# Patient Record
Sex: MALE | Race: WHITE
[De-identification: names, ages, dates, MRNs, and addresses within clinical notes are randomized per-mention and may not be internally consistent; named-entity substitution may affect disease eponyms.]

---

## 2021-11-21 ENCOUNTER — HOSPITAL ENCOUNTER (EMERGENCY)
Dept: HOSPITAL 100 - ED | Age: 66
Discharge: LEFT BEFORE DISCHARGE | End: 2021-11-21
Payer: COMMERCIAL

## 2021-11-21 VITALS
TEMPERATURE: 96.5 F | HEART RATE: 71 BPM | OXYGEN SATURATION: 98 % | DIASTOLIC BLOOD PRESSURE: 102 MMHG | RESPIRATION RATE: 16 BRPM | SYSTOLIC BLOOD PRESSURE: 193 MMHG

## 2021-11-21 VITALS — BODY MASS INDEX: 26.4 KG/M2

## 2021-11-21 DIAGNOSIS — R30.0: Primary | ICD-10-CM

## 2021-11-21 LAB
ANION GAP: 5 (ref 5–15)
BUN SERPL-MCNC: 20 MG/DL (ref 7–18)
BUN/CREAT RATIO: 17.5 RATIO (ref 10–20)
CALCIUM SERPL-MCNC: 9.5 MG/DL (ref 8.5–10.1)
CARBON DIOXIDE: 27 MMOL/L (ref 21–32)
CHLORIDE: 108 MMOL/L (ref 98–107)
DEPRECATED RDW RBC: 43.3 FL (ref 35.1–43.9)
ERYTHROCYTE [DISTWIDTH] IN BLOOD: 12.3 % (ref 11.6–14.6)
EST GLOM FILT RATE - AFR AMER: 82 ML/MIN (ref 60–?)
ESTIMATED CREATININE CLEARANCE: 71.06 ML/MIN
GLUCOSE: 106 MG/DL (ref 74–106)
HCT VFR BLD AUTO: 40.7 % (ref 40–54)
HEMOGLOBIN: 13.7 G/DL (ref 13–16.5)
HGB BLD-MCNC: 13.7 G/DL (ref 13–16.5)
IMMATURE GRANULOCYTES COUNT: 0.04 X10^3/UL (ref 0–0)
MCV RBC: 95.8 FL (ref 80–94)
MEAN CORP HGB CONC: 33.7 G/DL (ref 32–36)
MEAN PLATELET VOL.: 9.9 FL (ref 6.2–12)
MUCOUS THREADS URNS QL MICRO: (no result) /HPF
NRBC FLAGGED BY ANALYZER: 0 % (ref 0–5)
PLATELET # BLD: 192 K/MM3 (ref 150–450)
PLATELET COUNT: 192 K/MM3 (ref 150–450)
POTASSIUM: 3.9 MMOL/L (ref 3.5–5.1)
PROT UR QL STRIP.AUTO: 30 MG/DL
RBC # BLD AUTO: 4.25 M/MM3 (ref 4.6–6.2)
RBC DISTRIBUTION WIDTH CV: 12.3 % (ref 11.6–14.6)
RBC DISTRIBUTION WIDTH SD: 43.3 FL (ref 35.1–43.9)
RBC UR QL: (no result) /HPF (ref 0–5)
RBC UR QL: 250 /UL
SP GR UR: 1.02 (ref 1–1.03)
SQUAMOUS URNS QL MICRO: (no result) /HPF (ref 0–5)
URINE PRESERVATIVE: (no result)
WBC # BLD AUTO: 11.1 K/MM3 (ref 4.4–11)
WHITE BLOOD COUNT: 11.1 K/MM3 (ref 4.4–11)

## 2021-11-21 PROCEDURE — 36415 COLL VENOUS BLD VENIPUNCTURE: CPT

## 2021-11-21 PROCEDURE — 99282 EMERGENCY DEPT VISIT SF MDM: CPT

## 2021-11-21 PROCEDURE — 85025 COMPLETE CBC W/AUTO DIFF WBC: CPT

## 2021-11-21 PROCEDURE — 80048 BASIC METABOLIC PNL TOTAL CA: CPT

## 2021-11-21 PROCEDURE — 81001 URINALYSIS AUTO W/SCOPE: CPT

## 2021-11-21 PROCEDURE — 99281 EMR DPT VST MAYX REQ PHY/QHP: CPT

## 2023-01-13 ENCOUNTER — HOSPITAL ENCOUNTER (EMERGENCY)
Age: 68
Discharge: HOME | End: 2023-01-13
Payer: COMMERCIAL

## 2023-01-13 VITALS
OXYGEN SATURATION: 97 % | DIASTOLIC BLOOD PRESSURE: 101 MMHG | RESPIRATION RATE: 16 BRPM | HEART RATE: 59 BPM | SYSTOLIC BLOOD PRESSURE: 187 MMHG

## 2023-01-13 VITALS
SYSTOLIC BLOOD PRESSURE: 189 MMHG | OXYGEN SATURATION: 97 % | RESPIRATION RATE: 16 BRPM | DIASTOLIC BLOOD PRESSURE: 108 MMHG | HEART RATE: 56 BPM

## 2023-01-13 VITALS
SYSTOLIC BLOOD PRESSURE: 184 MMHG | DIASTOLIC BLOOD PRESSURE: 100 MMHG | RESPIRATION RATE: 18 BRPM | HEART RATE: 55 BPM | TEMPERATURE: 96.7 F | OXYGEN SATURATION: 96 %

## 2023-01-13 VITALS
HEART RATE: 59 BPM | SYSTOLIC BLOOD PRESSURE: 197 MMHG | OXYGEN SATURATION: 98 % | DIASTOLIC BLOOD PRESSURE: 102 MMHG | RESPIRATION RATE: 16 BRPM

## 2023-01-13 VITALS — BODY MASS INDEX: 26.6 KG/M2

## 2023-01-13 VITALS — OXYGEN SATURATION: 98 %

## 2023-01-13 DIAGNOSIS — R07.9: Primary | ICD-10-CM

## 2023-01-13 DIAGNOSIS — R00.2: ICD-10-CM

## 2023-01-13 DIAGNOSIS — F41.9: ICD-10-CM

## 2023-01-13 DIAGNOSIS — I10: ICD-10-CM

## 2023-01-13 LAB
ANION GAP: 4 (ref 5–15)
BUN SERPL-MCNC: 20 MG/DL (ref 7–18)
BUN/CREAT RATIO: 18.5 RATIO (ref 10–20)
CALCIUM SERPL-MCNC: 9 MG/DL (ref 8.5–10.1)
CARBON DIOXIDE: 28 MMOL/L (ref 21–32)
CARDIAC TROPONIN I PNL SERPL HS: 14 PG/ML (ref 3–78)
CHLORIDE: 109 MMOL/L (ref 98–107)
D-DIMER QUANTITATIVE (DVT/PE): 0.33 FEU/UG/M (ref 0.27–0.49)
DEPRECATED RDW RBC: 43.6 FL (ref 35.1–43.9)
ERYTHROCYTE [DISTWIDTH] IN BLOOD: 12.3 % (ref 11.6–14.6)
EST GLOM FILT RATE - AFR AMER: 87 ML/MIN (ref 60–?)
ESTIMATED CREATININE CLEARANCE: 73.98 ML/MIN
GLUCOSE: 110 MG/DL (ref 74–106)
HCT VFR BLD AUTO: 42.1 % (ref 40–54)
HEMOGLOBIN: 14.2 G/DL (ref 13–16.5)
HGB BLD-MCNC: 14.2 G/DL (ref 13–16.5)
IMMATURE GRANULOCYTES COUNT: 0.01 X10^3/UL (ref 0–0)
MCV RBC: 96.3 FL (ref 80–94)
MEAN CORP HGB CONC: 33.7 G/DL (ref 32–36)
MEAN PLATELET VOL.: 9.4 FL (ref 6.2–12)
NRBC FLAGGED BY ANALYZER: 0 % (ref 0–5)
PLATELET # BLD: 185 K/MM3 (ref 150–450)
PLATELET COUNT: 185 K/MM3 (ref 150–450)
POTASSIUM: 4.4 MMOL/L (ref 3.5–5.1)
RBC # BLD AUTO: 4.37 M/MM3 (ref 4.6–6.2)
RBC DISTRIBUTION WIDTH CV: 12.3 % (ref 11.6–14.6)
RBC DISTRIBUTION WIDTH SD: 43.6 FL (ref 35.1–43.9)
TROPONIN-I HS: 15 PG/ML (ref 3–78)
WBC # BLD AUTO: 4.5 K/MM3 (ref 4.4–11)
WHITE BLOOD COUNT: 4.5 K/MM3 (ref 4.4–11)

## 2023-01-13 PROCEDURE — A4216 STERILE WATER/SALINE, 10 ML: HCPCS

## 2023-01-13 PROCEDURE — 85025 COMPLETE CBC W/AUTO DIFF WBC: CPT

## 2023-01-13 PROCEDURE — 85379 FIBRIN DEGRADATION QUANT: CPT

## 2023-01-13 PROCEDURE — 80048 BASIC METABOLIC PNL TOTAL CA: CPT

## 2023-01-13 PROCEDURE — 71045 X-RAY EXAM CHEST 1 VIEW: CPT

## 2023-01-13 PROCEDURE — 84484 ASSAY OF TROPONIN QUANT: CPT

## 2023-01-13 PROCEDURE — 99283 EMERGENCY DEPT VISIT LOW MDM: CPT

## 2023-01-13 PROCEDURE — 93005 ELECTROCARDIOGRAM TRACING: CPT

## 2023-02-08 ENCOUNTER — HOSPITAL ENCOUNTER (OUTPATIENT)
Age: 68
Discharge: HOME | End: 2023-02-08
Payer: COMMERCIAL

## 2023-02-08 DIAGNOSIS — R07.9: Primary | ICD-10-CM

## 2023-02-08 PROCEDURE — A9500 TC99M SESTAMIBI: HCPCS

## 2023-02-08 PROCEDURE — A4216 STERILE WATER/SALINE, 10 ML: HCPCS

## 2023-02-08 PROCEDURE — 93017 CV STRESS TEST TRACING ONLY: CPT

## 2023-02-08 PROCEDURE — 78452 HT MUSCLE IMAGE SPECT MULT: CPT

## 2023-12-26 ENCOUNTER — HOSPITAL ENCOUNTER (OUTPATIENT)
Age: 68
Discharge: HOME | End: 2023-12-26
Payer: COMMERCIAL

## 2023-12-26 DIAGNOSIS — I10: Primary | ICD-10-CM

## 2023-12-26 DIAGNOSIS — R97.20: ICD-10-CM

## 2023-12-26 DIAGNOSIS — Z13.9: ICD-10-CM

## 2023-12-26 LAB
ALANINE AMINOTRANSFER ALT/SGPT: 31 U/L (ref 16–61)
ALBUMIN SERPL-MCNC: 3.7 G/DL (ref 3.2–5)
ALKALINE PHOSPHATASE: 53 U/L (ref 45–117)
ANION GAP: 4 (ref 5–15)
AST(SGOT): 24 U/L (ref 15–37)
BUN SERPL-MCNC: 24 MG/DL (ref 7–18)
BUN/CREAT RATIO: 22 RATIO (ref 10–20)
CALCIUM SERPL-MCNC: 8.7 MG/DL (ref 8.5–10.1)
CARBON DIOXIDE: 28 MMOL/L (ref 21–32)
CHLORIDE: 108 MMOL/L (ref 98–107)
DEPRECATED RDW RBC: 46.1 FL (ref 35.1–43.9)
ERYTHROCYTE [DISTWIDTH] IN BLOOD: 12.8 % (ref 11.6–14.6)
EST GLOM FILT RATE - AFR AMER: 86 ML/MIN (ref 60–?)
FERRITIN SERPL-MCNC: 158 NG/ML (ref 26–388)
FOLATES, (FOLIC ACID): 16.8 NG/ML (ref 3.1–55.4)
GLOBULIN: 3.5 G/DL (ref 2.2–4.2)
GLUCOSE: 101 MG/DL (ref 74–106)
HCT VFR BLD AUTO: 44.9 % (ref 40–54)
HEMOGLOBIN: 14.6 G/DL (ref 13–16.5)
HGB BLD-MCNC: 14.6 G/DL (ref 13–16.5)
IMMATURE GRANULOCYTES COUNT: 0.01 X10^3/UL (ref 0–0)
MCV RBC: 97.6 FL (ref 80–94)
MEAN CORP HGB CONC: 32.5 G/DL (ref 32–36)
MEAN PLATELET VOL.: 10.2 FL (ref 6.2–12)
NRBC FLAGGED BY ANALYZER: 0 % (ref 0–5)
PLATELET # BLD: 188 K/MM3 (ref 150–450)
PLATELET COUNT: 188 K/MM3 (ref 150–450)
POTASSIUM: 3.9 MMOL/L (ref 3.5–5.1)
PSA,TOTAL- DIAGNOSTIC: 4.6 NG/ML (ref 0–4)
RBC # BLD AUTO: 4.6 M/MM3 (ref 4.6–6.2)
RBC DISTRIBUTION WIDTH CV: 12.8 % (ref 11.6–14.6)
RBC DISTRIBUTION WIDTH SD: 46.1 FL (ref 35.1–43.9)
VITAMIN B12: 440 PG/ML (ref 211–911)
WBC # BLD AUTO: 4.9 K/MM3 (ref 4.4–11)
WHITE BLOOD COUNT: 4.9 K/MM3 (ref 4.4–11)

## 2023-12-26 PROCEDURE — 83090 ASSAY OF HOMOCYSTEINE: CPT

## 2023-12-26 PROCEDURE — 84153 ASSAY OF PSA TOTAL: CPT

## 2023-12-26 PROCEDURE — 83695 ASSAY OF LIPOPROTEIN(A): CPT

## 2023-12-26 PROCEDURE — 85025 COMPLETE CBC W/AUTO DIFF WBC: CPT

## 2023-12-26 PROCEDURE — 82728 ASSAY OF FERRITIN: CPT

## 2023-12-26 PROCEDURE — 36415 COLL VENOUS BLD VENIPUNCTURE: CPT

## 2023-12-26 PROCEDURE — 84270 ASSAY OF SEX HORMONE GLOBUL: CPT

## 2023-12-26 PROCEDURE — 83525 ASSAY OF INSULIN: CPT

## 2023-12-26 PROCEDURE — 80053 COMPREHEN METABOLIC PANEL: CPT

## 2023-12-26 PROCEDURE — 82607 VITAMIN B-12: CPT

## 2023-12-26 PROCEDURE — 82746 ASSAY OF FOLIC ACID SERUM: CPT

## 2024-06-17 ENCOUNTER — HOSPITAL ENCOUNTER (OUTPATIENT)
Dept: RADIOLOGY | Facility: HOSPITAL | Age: 69
Discharge: HOME | End: 2024-06-17
Payer: COMMERCIAL

## 2024-06-17 DIAGNOSIS — E78.5 HYPERLIPIDEMIA, UNSPECIFIED: ICD-10-CM

## 2024-06-17 PROCEDURE — 75571 CT HRT W/O DYE W/CA TEST: CPT

## 2024-09-27 ENCOUNTER — HOSPITAL ENCOUNTER (OUTPATIENT)
Age: 69
Discharge: HOME | End: 2024-09-27
Payer: COMMERCIAL

## 2024-09-27 DIAGNOSIS — I25.10: Primary | ICD-10-CM

## 2024-09-27 PROCEDURE — 78452 HT MUSCLE IMAGE SPECT MULT: CPT

## 2024-09-27 PROCEDURE — A9500 TC99M SESTAMIBI: HCPCS

## 2024-09-27 PROCEDURE — A4216 STERILE WATER/SALINE, 10 ML: HCPCS

## 2024-09-27 PROCEDURE — 93017 CV STRESS TEST TRACING ONLY: CPT

## 2024-09-27 NOTE — XMS RPT_ITS
"  
                         Comprehensive CCD (C-CDA v2.1)  
  
                         Created on: 2024  
  
  
Enrrique, Mr. Kalyan ISAACS  
External Reference #: CDR,[id number]:[id number]  
: 1955  
Sex: Male  
  
Demographics  
  
  
                                        Address             [address] [facility] [address]  
[place]  [address]  
   
                                        Home Phone          [phone]  
   
                                        Work Phone          [phone]  
   
                                        Mobile Phone        [phone]  
   
                                        Preferred Language  en  
   
                                        Marital Status        
   
                                        Buddhist Affiliation Unknown  
   
                                        Race                White  
   
                                        Ethnic Group        Not  or Lati  
no  
  
  
Author  
  
  
                                        Organization        [facility]  
  
  
Care Team Providers  
  
  
                                Care Team Member Name Role            Phone  
   
                                Fast, Makayla A   Unavailable     [phone]  
   
                                Manchak Thea Unavailable     Unavailable  
   
                                Unavailable     Unavailable     [phone]  
   
                                Raya Brandon Unavailable     [phone]  
[phone]  
   
                                Fast DO, Makayla A Unavailable     [phone]  
   
                                Raya Brandon Unavailable     [phone]  
[phone]  
   
                                Elyssa Lucoi LPN Unavailable     Unavailable  
   
                                Unavailable     Unavailable     [phone]  
   
                                Manwilliamk CMA, Thea Unavailable     Unavailable  
   
                                Fast DO, Makayla A Unavailable     [phone]  
   
                                CowanRonald tripp DO Primary Care Provider [phone]  
[phone]  
   
                                Rosine SHELDON Kayela Unavailable     Unavailable  
   
                                Cowan Ronald BARRERA Primary Care Provider [phone]  
[phone]  
   
                                Fast DO, Makayla A Attending       Unavailable  
   
                                Fast DO, Makayla A Consulting      Unavailable  
   
                                Fast DO, Makayla A Primary Care Provider [phone]  
[phone]  
   
                                Ronald Cowan DO Primary Care Provider [phone]  
[phone]  
   
                                FAST, MAKAYLA A   Referring       Unavailable  
   
                                FAST, MAKAYLA A   Primary Care    Unavailable  
   
                                RONALD COWAN Primary Care    Unavailable  
   
                                NILAY DOS SANTOS  Attending       Unavailable  
   
                                RONALD COWAN Primary Care    Unavailable  
   
                                NILAY DOS SANTOS  Referring       Unavailable  
  
  
  
Allergies  
  
  
                                                    Allergy   
Classification                          Reported   
Allergen(s)               Allergy Type              Date of   
Onset                     Reaction(s)               Facility  
   
                                                      
(1 source)                              ALLERGIES NOT ON   
FILE;   
Translations:   
[ALLERGIES NOT   
ON FILE]                                Propensity to   
adverse   
reactions   
(disorder)                                                  [facility]   
Repository  
  
  
  
Medications  
Current Medications 
[id number]|2024 14:17:00|2024 14:17:00|STRESSREP_ITS||Cardiovascular Services|[id number]|"Stress Test Report

## 2024-09-27 NOTE — STRESSREP
Stress Test Report
Exercise myocardial perfusion stress test.

68-year-old man with a history of coronary artery disease

Stress protocol:
Resting EKG demonstrates atrial fibrillation with a ventricular response rate of 59 bpm resting blood pressure is 118/72 mmHg.  The patient exercised according to the regular Castillo protocol for a total duration of 9 minutes and 44 seconds attaining 
a maximum heart rate of 150 bpm which was 99% of maximum predicted heart rate; the maximum workload was 12.5 metabolic equivalents.  At rest there were no ST or T wave changes noted to suggest ischemia and at peak exercise upsloping ST changes only 
were noted which did not meet the criteria for ischemia.  No clinical angina was noted the test was terminated due to the target heart rate being achieved/fatigue.  The peak blood pressure was 174/68 mmHg.  Rate-pressure product was 21,500.

Myocardial perfusion protocol.
14.6 mCi of technetium 99m sestamibi was injected at rest.  The patient exercised according to regular Castillo protocol for total duration of 9 minutes and 44 seconds and at peak exercise 44.1 mCi of technetium 99m sestamibi was injected stress images 
were obtained stress and rest images were reconstructed in comparing the short axis vertical long and horizontal long axis.  Gated images were also obtained.

Perfusion SPECT analysis:
Review of the stress images demonstrate normal uptake of tracer noted in all areas of the myocardium.  The resting images similarly demonstrate normal uptake of tracer noted in all areas of the myocardium.  No areas of reversibility are noted to 
suggest ischemia no previous infarct was noted.  

Gated SPECT analysis:
The gated ejection fraction is 62%.

Conclusion:
Normal exercise myocardial perfusion stress test at a high workload
Preserved ejection fraction.
Good functional aerobic capacity
Atrial fibrillation noted

## 2024-10-21 ENCOUNTER — HOSPITAL ENCOUNTER (OUTPATIENT)
Dept: HOSPITAL 100 - CVS | Age: 69
Discharge: HOME | End: 2024-10-21
Payer: COMMERCIAL

## 2024-10-21 DIAGNOSIS — I48.91: Primary | ICD-10-CM

## 2024-10-21 PROCEDURE — 93306 TTE W/DOPPLER COMPLETE: CPT

## 2024-10-21 NOTE — ECHOD_ITS
Reason For Study: ATRIAL FIBRILLATION 
 
Procedure 
This was a 2D Doppler, Color Flow transthoracic echocardiogram. Exam performed in department. 
 
Left Ventricle 
Normal LV size. Mild concentric left ventricular hypertrophy. The left ventricular ejection fraction 
is 65 %. No evidence for diastolic dysfunction. 
 
Right Ventricle 
Normal right ventricle. 
 
Atria 
The left and right atria are normal. 
 
Mitral Valve 
Trivial mitral valve insufficiency. 
 
Tricuspid Valve 
Trivial tricuspid valve insufficiency. Right ventricular systolic pressure estimated to be 35 mmHg. 
 
Aortic Valve 
Trisinus/trileaflet aortic valve. Mild (1+) eccentric aortic valve insufficiency. 
 
Pulmonic Valve 
The pulmonic valve is not well visualized. Trivial pulmonic valve insufficiency. 
 
Great Vessels 
Mildly dilated aortic root. 
 
Pericardium/Pleural 
No pericardial effusion. 
 
MMode/2D Measurements & Calculations 
LVIDd: 5.0 cm                IVSd: 1.2 cm                              LVOT diam: 2.2 cm 
LVIDs: 3.1 cm                LVPWd: 1.1 cm                             LVOT area: 3.8 cm2 
RVDd: 3.8 cm                 FS: 36.9 % 
          _________________________________________________________________________________ 
asc Aorta Diam: 4.1 cm       LAV(MOD-bp): 69.4 ml                      LVAd ap4: 29.8 cm2 
                             LAV(MOD-bp) Indexed: 32.4 ml/m2           LVLd ap4: 8.5 cm 
                             LAV(MOD-sp2): 76.6 ml                     EDV(MOD-sp4): 86.2 ml 
                             LAV(MOD-sp4): 57.5 ml                     EDV(sp4-el): 88.2 ml 
 
                                                                       LVAs ap4: 17.9 cm2 
                                                                       LVLs ap4: 6.7 cm 
                                                                       ESV(MOD-sp4): 40.3 ml 
                                                                       ESV(sp4-el): 40.2 ml 
                                                                       EF(MOD-sp4): 53.2 % 
                                                                       EF(sp4-el): 54.4 % 
          _________________________________________________________________________________ 
LVAd ap2: 28.9 cm2           SV(MOD-sp4): 45.9 ml                      SV(MOD-sp2): 52.3 ml 
LVLd ap2: 8.4 cm 
EDV(MOD-sp2): 85.6 ml 
EDV(sp2-el): 83.9 ml 
LVAs ap2: 15.5 cm2 
LVLs ap2: 6.3 cm 
ESV(MOD-sp2): 33.3 ml 
ESV(sp2-el): 32.3 ml 
EF(MOD-sp2): 61.1 % 
 
          _________________________________________________________________________________ 
SV(sp4-el): 47.9 ml          Ao sinus diam: 3.9 cm                     Ao ST Junction: 3.5 cm 
 
          _________________________________________________________________________________ 
                             LA dimension(2D): 3.9 cm 
LA A4 area: 20.5 cm2                                                   RA A4 area: 17.9 cm2 
          _________________________________________________________________________________ 
TAPSE: 2.2 cm 
 
Time Measurements 
MV dec time: 0.31 sec 
 
Doppler Measurements & Calculations 
MV E max ed: 78.0 cm/sec        Lat Peak E' Ed: 8.6 cm/sec       Med Peak E' Ed: 6.8 cm/sec 
MV A max ed: 67.0 cm/sec        E/E' lat: 9.1                     E/E' med: 11.5 
MV E/A: 1.2 
          _________________________________________________________________________________ 
MV dec slope: 251.4 cm/sec2      Ao V2 max: 124.7 cm/sec           LV V1 max: 83.6 cm/sec 
                                 Ao max P.2 mmHg               LV V1 max P.8 mmHg 
                                 Ao V2 mean: 99.8 cm/sec           LV V1 mean P.6 mmHg 
                                 Ao mean P.1 mmHg              LV V1 mean: 59.1 cm/sec 
                                 Ao V2 VTI: 26.2 cm                LV V1 VTI: 18.5 cm 
                                 AV (velocity ratio): 0.70 
                                 CHAYITO(I,D): 2.6 cm2 
                                 CHAYITO(V,D): 2.5 cm2 
 
          _________________________________________________________________________________ 
SV(LVOT): 69.3 ml                PA V2 max: 92.2 cm/sec            TR max ed: 221.7 cm/sec 
                                 PA max PG (full): 0.72 mmHg       TR max P.7 mmHg 
 
ORDER #: [id number] ECHO/Echo Complete  
Interpretation Summary  
Mild concentric left ventricular hypertrophy.  
The left ventricular ejection fraction is 65 %.  
No evidence for diastolic dysfunction.  
Mild (1+) eccentric aortic valve insufficiency.  
Mildly dilated aortic root.  
 
  
 
  
______________________________________________________________________________  
Ordering Physician: Nazanin Johnson  
Referring Physician: Nazanin Johnson  
Performed By: Erin Lindsay RDCS  
Electronically signed by: Darrius Cheung MD on 10/21/2024 01:48 PM

## 2024-11-22 ENCOUNTER — HOSPITAL ENCOUNTER (OUTPATIENT)
Dept: HOSPITAL 100 - PSN | Age: 69
Discharge: HOME | End: 2024-11-22
Payer: COMMERCIAL

## 2024-11-22 DIAGNOSIS — I49.3: Primary | ICD-10-CM

## 2024-11-22 PROCEDURE — 93226 XTRNL ECG REC<48 HR SCAN A/R: CPT

## 2024-11-22 PROCEDURE — 93225 XTRNL ECG REC<48 HRS REC: CPT

## 2025-06-30 ENCOUNTER — HOSPITAL ENCOUNTER (OUTPATIENT)
Dept: HOSPITAL 100 - CT | Age: 70
Discharge: HOME | End: 2025-06-30
Payer: COMMERCIAL

## 2025-06-30 DIAGNOSIS — I77.810: Primary | ICD-10-CM

## 2025-06-30 DIAGNOSIS — R86.1: ICD-10-CM

## 2025-06-30 PROCEDURE — 74178 CT ABD&PLV WO CNTR FLWD CNTR: CPT

## 2025-06-30 PROCEDURE — 71275 CT ANGIOGRAPHY CHEST: CPT

## 2025-06-30 PROCEDURE — A4216 STERILE WATER/SALINE, 10 ML: HCPCS
